# Patient Record
(demographics unavailable — no encounter records)

---

## 2025-05-19 NOTE — PHYSICAL EXAM
[de-identified] : r copious cerumen noted, removed atraumatically with suction and alligator forceps, l nlb tm intact [Normal] : no rashes [de-identified] : gait steady

## 2025-05-19 NOTE — HISTORY OF PRESENT ILLNESS
[de-identified] : 79 y/o F with h/o b snhl and cerebral hemangioma managed by Dr. Alejandra.  States that she sees Dr. Alejandra every 2 years and hemangioma has been slowly growing. Here to check ears. reports worsening hearing over time. Was last seen in office in 6/20/2018 for hearing loss. Denies otalgia. Wears hearing aids.

## 2025-05-19 NOTE — PHYSICAL EXAM
[de-identified] : r copious cerumen noted, removed atraumatically with suction and alligator forceps, l nlb tm intact [Normal] : no rashes [de-identified] : gait steady

## 2025-05-19 NOTE — ASSESSMENT
[FreeTextEntry1] : 1. cerumen impaction, R -r copious cerumen noted, removed atraumatically with suction and alligator forceps, b tm intact -ear felt better -avoid qtip use  2. snhl -audio: b downsloping snhl; no change - reviewed with pt - she wished to make sure her HA were optimized - brought to  to have her discuss with Audiology  3. Intracranial hemangioma- recommended to follow up with Dr Alejandra in the near future- she said she would call [facility]  rtc 1 yr and as needed

## 2025-05-19 NOTE — PROCEDURE
[Cerumen Impaction] : Cerumen Impaction [Same] : same as the Pre Op Dx. [] : Removal of Cerumen [FreeTextEntry6] : r copious cerumen noted, removed atraumatically with suction and alligator forceps, b tm intact

## 2025-05-19 NOTE — ASSESSMENT
[FreeTextEntry1] : 1. cerumen impaction, R -r copious cerumen noted, removed atraumatically with suction and alligator forceps, b tm intact -ear felt better -avoid qtip use  2. snhl -audio: b downsloping snhl; no change - reviewed with pt - she wished to make sure her HA were optimized - brought to  to have her discuss with Audiology  3. Intracranial hemangioma- recommended to follow up with Dr Alejandra in the near future- she said she would call [facility]  rtc 1 yr and as needed

## 2025-05-19 NOTE — HISTORY OF PRESENT ILLNESS
[de-identified] : 79 y/o F with h/o b snhl and cerebral hemangioma managed by Dr. Alejandra.  States that she sees Dr. Alejandra every 2 years and hemangioma has been slowly growing. Here to check ears. reports worsening hearing over time. Was last seen in office in 6/20/2018 for hearing loss. Denies otalgia. Wears hearing aids.